# Patient Record
Sex: FEMALE | URBAN - METROPOLITAN AREA
[De-identification: names, ages, dates, MRNs, and addresses within clinical notes are randomized per-mention and may not be internally consistent; named-entity substitution may affect disease eponyms.]

---

## 2020-06-25 ENCOUNTER — NEW PATIENT (OUTPATIENT)
Dept: URBAN - METROPOLITAN AREA CLINIC 24 | Facility: CLINIC | Age: 75
End: 2020-06-25
Payer: MEDICARE

## 2020-06-25 DIAGNOSIS — Z96.1 PRESENCE OF INTRAOCULAR LENS: ICD-10-CM

## 2020-06-25 DIAGNOSIS — H35.3132 NONEXUDATIVE MACULAR DEGENERATION, INTERMEDIATE DRY STAGE, BILATERAL: Primary | ICD-10-CM

## 2020-06-25 DIAGNOSIS — H43.813 VITREOUS DEGENERATION, BILATERAL: ICD-10-CM

## 2020-06-25 PROCEDURE — 92015 DETERMINE REFRACTIVE STATE: CPT | Performed by: OPTOMETRIST

## 2020-06-25 PROCEDURE — 92004 COMPRE OPH EXAM NEW PT 1/>: CPT | Performed by: OPTOMETRIST

## 2020-06-25 PROCEDURE — 92134 CPTRZ OPH DX IMG PST SGM RTA: CPT | Performed by: OPTOMETRIST

## 2020-06-25 ASSESSMENT — VISUAL ACUITY
OS: 20/20
OD: 20/20

## 2022-09-15 ENCOUNTER — OFFICE VISIT (OUTPATIENT)
Dept: URBAN - METROPOLITAN AREA CLINIC 23 | Facility: CLINIC | Age: 77
End: 2022-09-15
Payer: MEDICARE

## 2022-09-15 DIAGNOSIS — H35.3211 EXDTVE AGE-REL MCLR DEGN, RIGHT EYE, WITH ACTV CHRDL NEOVAS: Primary | ICD-10-CM

## 2022-09-15 DIAGNOSIS — H35.3124 NEXDTVE AGE-REL MCLR DEGN, L EYE, ADV ATRPC WITH SBFVL INVL: ICD-10-CM

## 2022-09-15 PROCEDURE — 99204 OFFICE O/P NEW MOD 45 MIN: CPT | Performed by: OPTOMETRIST

## 2022-09-15 PROCEDURE — 92134 CPTRZ OPH DX IMG PST SGM RTA: CPT | Performed by: OPTOMETRIST

## 2022-09-15 ASSESSMENT — INTRAOCULAR PRESSURE
OS: 16
OD: 16

## 2022-09-15 ASSESSMENT — KERATOMETRY
OS: 43.63
OD: 42.88

## 2022-09-15 NOTE — IMPRESSION/PLAN
Impression: Exdtve age-rel mclr degn, right eye, with actv chrdl neovas: H35.3211. Plan: Pt edu on all findings. SRF OD on MAC OCT. Referral to Dr. Bertha Monaco for eval and tx within 2-3 weeks.

## 2022-10-12 ENCOUNTER — OFFICE VISIT (OUTPATIENT)
Dept: URBAN - METROPOLITAN AREA CLINIC 23 | Facility: CLINIC | Age: 77
End: 2022-10-12
Payer: MEDICARE

## 2022-10-12 DIAGNOSIS — H04.123 DRY EYE SYNDROME OF BILATERAL LACRIMAL GLANDS: ICD-10-CM

## 2022-10-12 DIAGNOSIS — H35.3123 NEXDTVE AGE-REL MCLR DEGN, L EYE, ADV ATRPC W/O SBFVL INVL: ICD-10-CM

## 2022-10-12 DIAGNOSIS — H35.3211 EXUDATIVE AGE-RELATED MACULAR DEGENERATION, RIGHT EYE, WITH ACTIVE CHOROIDAL NEOVASCULARIZATION: Primary | ICD-10-CM

## 2022-10-12 PROCEDURE — 92134 CPTRZ OPH DX IMG PST SGM RTA: CPT | Performed by: OPHTHALMOLOGY

## 2022-10-12 PROCEDURE — 99204 OFFICE O/P NEW MOD 45 MIN: CPT | Performed by: OPHTHALMOLOGY

## 2022-10-12 ASSESSMENT — INTRAOCULAR PRESSURE
OS: 16
OD: 16

## 2022-10-12 NOTE — IMPRESSION/PLAN
Impression: Dry eye syndrome of bilateral lacrimal glands: H04.123. Bilateral. Condition: worsening. Plan: Dry eyes account for the patient's complaints. There is no evidence of permanent changes to the cornea. Explained condition does not have a cure and will need artificial tears for maintenance. Patient states the eye qtts are not helping her, she would like to know other options to help with her BETO.  Recommend an evaluation w/Optometrist.

## 2022-10-12 NOTE — IMPRESSION/PLAN
Impression: Exudative age-related macular degeneration, right eye, with active choroidal neovascularization: H35.3211. Right. Condition: new problem addtl w/u needed. Vision: vision affected. Plan: Discussed diagnosis in detail with patient. Discussed risks of progression with present condition. Based on findings recommend Intravitreal Injection Therapy to help reduce the fluid and prevent a further reduction in vision. Discussed the risks and benefits of tx. All questions answered. Patient elects to proceed with recommendation. OCT shows PED with fluid OD and Optos shows active heme OD.

## 2022-10-12 NOTE — IMPRESSION/PLAN
Impression: Nexdtve age-rel mclr degn, l eye, adv atrpc w/o sbfvl invl: Z25.8551. Left. Condition: established, stable. Vision: vision affected. Plan: Discussed diagnosis in detail with patient. Exam OS shows macular changes associated with Dry ARMD. No treatment is required at this time. Use of vitamins has shown to improve the effects of ARMD. Recommend AREDS 2 formula. Call the office for an immediate appointment if [address] worsens. Educational material provided to patient. OCT and Optos OS shows small Drusen changes.

## 2022-10-21 ENCOUNTER — PROCEDURE (OUTPATIENT)
Dept: URBAN - METROPOLITAN AREA CLINIC 23 | Facility: CLINIC | Age: 77
End: 2022-10-21
Payer: MEDICARE

## 2022-10-21 DIAGNOSIS — H35.3211 EXUDATIVE AGE-RELATED MACULAR DEGENERATION, RIGHT EYE, WITH ACTIVE CHOROIDAL NEOVASCULARIZATION: Primary | ICD-10-CM

## 2022-10-21 PROCEDURE — 67028 INJECTION EYE DRUG: CPT | Performed by: OPHTHALMOLOGY

## 2022-12-02 ENCOUNTER — OFFICE VISIT (OUTPATIENT)
Dept: URBAN - METROPOLITAN AREA CLINIC 23 | Facility: CLINIC | Age: 77
End: 2022-12-02
Payer: MEDICARE

## 2022-12-02 DIAGNOSIS — H35.3123 NEXDTVE AGE-REL MCLR DEGN, L EYE, ADV ATRPC W/O SBFVL INVL: ICD-10-CM

## 2022-12-02 DIAGNOSIS — H35.3211 EXUDATIVE AGE-RELATED MACULAR DEGENERATION, RIGHT EYE, WITH ACTIVE CHOROIDAL NEOVASCULARIZATION: Primary | ICD-10-CM

## 2022-12-02 PROCEDURE — 92134 CPTRZ OPH DX IMG PST SGM RTA: CPT | Performed by: OPHTHALMOLOGY

## 2022-12-02 PROCEDURE — 99213 OFFICE O/P EST LOW 20 MIN: CPT | Performed by: OPHTHALMOLOGY

## 2022-12-02 ASSESSMENT — INTRAOCULAR PRESSURE
OS: 14
OD: 14

## 2022-12-02 NOTE — IMPRESSION/PLAN
Impression: Nexdtve age-rel mclr degn, l eye, adv atrpc w/o sbfvl invl: N39.3394. Left. Condition: established, stable. Vision: vision affected. Plan: Discussed diagnosis in detail with patient. Exam OS shows macular changes associated with Dry ARMD. No treatment is required at this time. Use of vitamins has shown to improve the effects of ARMD. Recommend AREDS 2 formula. Call the office for an immediate appointment if [address] worsens. Educational material provided to patient. OCT and Optos OS shows small Drusen changes.

## 2022-12-02 NOTE — IMPRESSION/PLAN
Impression: Exudative age-related macular degeneration, right eye, with active choroidal neovascularization: H35.3211. Right. Condition: improving. Vision: vision affected. s/p AV OD #1  10/21/22 Plan: Discussed diagnosis in detail with patient. Discussed risks of progression with present condition. Based on findings recommend Intravitreal Injection Treatment RIGHT EYE with AVASTIN to help reduce the fluid and prevent a further reduction in vision. Discussed the risks and benefits of tx. All questions answered. Patient elects to proceed with recommendation. OCT OD shows PED with decrease in CMT and Optos OD shows a decrease in heme. (pt is unable to comeback this afternoon, will schedule for 12/16/22).

## 2022-12-16 ENCOUNTER — PROCEDURE (OUTPATIENT)
Dept: URBAN - METROPOLITAN AREA CLINIC 23 | Facility: CLINIC | Age: 77
End: 2022-12-16
Payer: MEDICARE

## 2022-12-16 DIAGNOSIS — H35.3211 EXUDATIVE AGE-RELATED MACULAR DEGENERATION, RIGHT EYE, WITH ACTIVE CHOROIDAL NEOVASCULARIZATION: Primary | ICD-10-CM

## 2022-12-16 PROCEDURE — 67028 INJECTION EYE DRUG: CPT | Performed by: OPHTHALMOLOGY

## 2023-03-03 ENCOUNTER — OFFICE VISIT (OUTPATIENT)
Dept: URBAN - METROPOLITAN AREA CLINIC 23 | Facility: CLINIC | Age: 78
End: 2023-03-03
Payer: MEDICARE

## 2023-03-03 DIAGNOSIS — H35.3123 NEXDTVE AGE-REL MCLR DEGN, L EYE, ADV ATRPC W/O SBFVL INVL: ICD-10-CM

## 2023-03-03 DIAGNOSIS — H35.3211 EXUDATIVE AGE-RELATED MACULAR DEGENERATION, RIGHT EYE, WITH ACTIVE CHOROIDAL NEOVASCULARIZATION: Primary | ICD-10-CM

## 2023-03-03 PROCEDURE — 92134 CPTRZ OPH DX IMG PST SGM RTA: CPT | Performed by: OPHTHALMOLOGY

## 2023-03-03 PROCEDURE — 99213 OFFICE O/P EST LOW 20 MIN: CPT | Performed by: OPHTHALMOLOGY

## 2023-03-03 ASSESSMENT — INTRAOCULAR PRESSURE
OD: 17
OS: 13

## 2023-03-03 NOTE — IMPRESSION/PLAN
Impression: Nexdtve age-rel mclr degn, l eye, adv atrpc w/o sbfvl invl: H33.5608. Left. Condition: established, stable. Vision: vision affected. Plan: Discussed diagnosis in detail with patient. No treatment is required at this time based on exam and diagnostic tests. Recommend observation for now. Will reassess condition in 6 wks. OCT and Optos shows small Drusen changes OS. Continue taking the eye vitamins - AREDS 2 formula.

## 2023-03-03 NOTE — IMPRESSION/PLAN
Impression: Exudative age-related macular degeneration, right eye, with active choroidal neovascularization: H35.2071. Right. Condition: improving. Vision: vision affected. s/p AV OD AV OD #1  10/21/22 Plan: Discussed diagnosis in detail with patient. Discussed risks of progression with present condition. Based on findings recommend Intravitreal Injection Treatment RIGHT EYE with AVASTIN to help reduce the fluid and prevent a further reduction in vision. Discussed the risks and benefits of tx. All questions answered. Patient elects to proceed with recommendation. OCT OD shows mild fluid temp aspect of PED  and Optos OD shows minimal fluid.

## 2023-03-10 ENCOUNTER — PROCEDURE (OUTPATIENT)
Dept: URBAN - METROPOLITAN AREA CLINIC 23 | Facility: CLINIC | Age: 78
End: 2023-03-10
Payer: MEDICARE

## 2023-03-10 DIAGNOSIS — H35.3211 EXUDATIVE AGE-RELATED MACULAR DEGENERATION, RIGHT EYE, WITH ACTIVE CHOROIDAL NEOVASCULARIZATION: Primary | ICD-10-CM

## 2023-03-10 PROCEDURE — 67028 INJECTION EYE DRUG: CPT | Performed by: OPHTHALMOLOGY
